# Patient Record
Sex: MALE | URBAN - METROPOLITAN AREA
[De-identification: names, ages, dates, MRNs, and addresses within clinical notes are randomized per-mention and may not be internally consistent; named-entity substitution may affect disease eponyms.]

---

## 2017-09-08 NOTE — PATIENT DISCUSSION
Discussed the need to shake her medicine bottles, especially the Durezol.  Instilled a drop this morning at 8:45 a. m.

## 2017-09-22 NOTE — PATIENT DISCUSSION
Patient reassured that va is improving in the OD.  Patient told that lens is positioned with in normal limits and vision is really good today.   Patient informed she may need a yag sooner in OD secondary to trace central early pco OD.

## 2017-09-22 NOTE — PATIENT DISCUSSION
Patient educated on neuroadaptation, Dry eyes and vision, increase overhead lighting, and near focal range.

## 2018-01-04 NOTE — PATIENT DISCUSSION
"Good postoperative appearance.  Patient is aware of the ""pupil fluttering"" phenomenon that has been bothering her since the implant surgery.  Again

## 2018-01-04 NOTE — PATIENT DISCUSSION
"Questions regarding the possibility of ""mechanical failure"" of the implant.  There is a mild degree of decentration exposing more of the rings visible in the pupillary area. "

## 2018-03-14 NOTE — PROCEDURE NOTE: SURGICAL
Prior to commencing surgery patient identification, surgical procedure, site, and side were confirmed by Dr. Lance Richardson. Following topical proparacaine anesthesia, the patient was positioned at the YAG laser, a contact lens coupled to the cornea with methylcellulose and an axial posterior capsulotomy performed without complication using 2.9 Mj x 23. Excess methylcellulose was washed from the eye, one drop of Alphagan was instilled and the patient returned to the holding area having tolerated the procedure well and without complication. <br />[facility]

## 2018-03-21 NOTE — PROCEDURE NOTE: SURGICAL
Prior to commencing surgery patient identification, surgical procedure, site, and side were confirmed by Dr. Steve Mcguire. Following topical proparacaine anesthesia, the patient was positioned at the YAG laser, a contact lens coupled to the cornea with methylcellulose and an axial posterior capsulotomy performed without complication using 2.5 Mj x 37. Excess methylcellulose was washed from the eye, one drop of Alphagan was instilled and the patient returned to the holding area having tolerated the procedure well and without complication. <br />[id number]

## 2018-05-25 NOTE — PATIENT DISCUSSION
"Questions regarding the possibility of ""mechanical failure"" of the implant.  There is a mild degree of decentration exposing more of the rings visible in the pupillary area.  We talked about neuroadapatation but she really is bothered by the ""hazy"" or ""foggy"" vision that she now is learning to deal with.  There does not seem to be any medical issue with either her retina

## 2020-05-07 ENCOUNTER — IMPORTED ENCOUNTER (OUTPATIENT)
Dept: URBAN - METROPOLITAN AREA CLINIC 50 | Facility: CLINIC | Age: 30
End: 2020-05-07

## 2025-03-02 NOTE — PATIENT DISCUSSION
"Questions regarding the possibility of ""mechanical failure"" of the implant.  There is a mild degree of decentration exposing more of the rings visible in the pupillary area. " None Awake/Alert